# Patient Record
Sex: FEMALE | Race: WHITE | NOT HISPANIC OR LATINO | Employment: FULL TIME | URBAN - METROPOLITAN AREA
[De-identification: names, ages, dates, MRNs, and addresses within clinical notes are randomized per-mention and may not be internally consistent; named-entity substitution may affect disease eponyms.]

---

## 2022-03-10 ENCOUNTER — MEDICAL CORRESPONDENCE (OUTPATIENT)
Dept: HEALTH INFORMATION MANAGEMENT | Facility: CLINIC | Age: 47
End: 2022-03-10

## 2022-03-11 ENCOUNTER — REFERRAL (OUTPATIENT)
Dept: TRANSPLANT | Facility: CLINIC | Age: 47
End: 2022-03-11

## 2022-03-11 DIAGNOSIS — K70.30 ALCOHOLIC CIRRHOSIS (H): Primary | ICD-10-CM

## 2022-03-11 DIAGNOSIS — K70.31 ALCOHOLIC CIRRHOSIS OF LIVER WITH ASCITES (H): ICD-10-CM

## 2022-03-11 NOTE — LETTER
April 7, 2022    Priyanka RODRIGUEZ Dejesus  [address] [facility] [address]    Dear Ms. Dejesus,   The purpose of this letter is to let you know that on  [facility] liver transplant team will be closing your referral at this time. Based on the results from [facility] and the selection criteria used by our program , the decision was made to close your referral at this time. This is because of your sober date of one month ago, your current health status, and lack of insurance at this time.   Important things you should know:    If you would like to discuss the decision, or if your medical status changes you may schedule a return visits with your doctor by calling [phone] and asking to speak to your transplant coordinator.    We recommend that you continue to follow up with your primary care doctor and Florinda Ley NP in order to manage your health concerns.    Like we spoke about, please call me when you have proper insurance lined up and I will set you up with a virtual consult with one of our hepatologist.   Enclosed is a letter from [facility] which describes the services offered to patients by [facility] and the Organ Procurement and Transplantation Network.  Thank you for allowing us to participate in your care.  We wish you well.  Sincerely,    Gal Cisneros Jr., BSN, RN  Liver Transplant Coordinator  [phone]    Enclosures: OS Letter  cc: Care Team            The [facility]ment [facility]plantation Network  Toll-free patient services line:     Your resource for organ transplant information    If you have a question regarding your own medical care, you always should call your transplant hospital first. However, for general organ transplant-related information, you can call the Organ Procurement and Transplantation Network (OPTN) toll-free patient services line at [phone]. Anyone, including potential transplant candidates, candidates, recipients, family members, friends, living donors, and donor  family members, can call this number to:          Talk about organ donation, living donation, the transplant process, the donation process, and transplant policies.    Get a free patient information kit with helpful booklets, waiting list and transplant information, and a list of all transplant hospitals.    Ask questions about the OPTN website (https://optn.transplant.hrsa.gov/), the United Network for Organ Sharing s (UNOS) website (https://unos.org/), or the UNOS website for living donors and transplant recipients. (https://www.transplantliving.org/).    Learn how the OPTN can help you.    Talk about any concerns that you may have with a transplant hospital.    The [facility] s transplant system, the OPTN, is managed under federal contract by the United Network for Organ Sharing (UNOS), which is a non-profit charitable organization. The OPTN helps create and define organ sharing policies that make the best use of donated organs. This process continuously evaluating new advances and discoveries so policies can be adapted to best serve patients waiting for transplants. To do so, the OPTN works closely with transplant professionals, transplant patients, transplant candidates, donor families, living donors, and the public. All transplant programs and organ procurement organizations throughout the country are OPTN members and are obligated to follow the policies the OPTN creates for allocating organs.    The OPTN also is responsible for:      Providing educational material for patients, the public, and professionals.    Raising awareness of the need for donated organs and tissue.    Coordinating organ procurement, matching, and placement.    Collecting information about every organ transplant and donation that occurs in the [place].    Remember, you should contact your transplant hospital directly if you have questions or concerns about your own medical care including medical records, work-up progress, and test  results.    We are not your transplant hospital, and our staff will not be able to answer questions about your case, so please keep your transplant hospital s phone number handy.    However, while you research your transplant needs and learn as much as you can about transplantation and donation, we welcome your call to our toll-free patient services line at [phone].          Updated 4/1/2019

## 2022-03-11 NOTE — LETTER
March 18, 2022        Priyanka Dejesus  [address]  [facility], [place] [address]      Dear Healther,       You have recently expressed interest in our Solid Organ Transplant Program and have requested to begin the evaluation process.  We have made several attempts to get in touch with you but have been unable to reach you.    If you are still interested and/or have any questions, please contact us at  [phone] or [phone] and ask to speak with the .      Monday - Friday, between the hours of 8:00am and 5:00pm central time.    We look forward to hearing from you.      Regards,     Solid Organ Transplant Intake Team  [address]  [address], [facility] [address]  [place] [address]

## 2022-03-22 VITALS — BODY MASS INDEX: 29.99 KG/M2 | HEIGHT: 65 IN | WEIGHT: 180 LBS

## 2022-03-22 NOTE — TELEPHONE ENCOUNTER
Intake note not completed due to patient not having insurance.   Patient gave verbal consent to obtain records outside of [facility]/[place].     Sent message to [facility] and P[facility].     JACKLYN Robertson, LPN       Solid Organ Transplant

## 2022-04-06 PROBLEM — K70.31 ALCOHOLIC CIRRHOSIS OF LIVER WITH ASCITES (H): Status: ACTIVE | Noted: 2022-04-06

## 2022-04-06 NOTE — TELEPHONE ENCOUNTER
Called and LVM for patient.    Appears she discharged from [facility] 4/4/22.    Will try again later in the week.

## 2022-04-07 NOTE — TELEPHONE ENCOUNTER
Patient discharged and starting to feel better after being back home for a couple days.    Still has not figured out insurance yet.     Referral from Florinda Ley NP at [facility].     MELD 19     with no kids  Works as     First found out about liver disease early March (3/8/22 had first meeting with Florinda Ley NP at [facility]).    Last alcohol use was 3/3/22  Reports stopped right away and no reported issues  Reports 2-3 vodka drinks a day x approx 15-20 yrs    Patient had set up consult with therapist, but     Mobility is biggest issue    Head: no  Heart: no  Lungs: no  Kidneys: no  Cancer: no    She had a colonoscopy in 2015 with removal of a couple colon polyps.   She was told that she was to have a followup colonoscopy in 5 years    Plan: patient is going to work on getting insurance and has follow up appt with Nathaniel Ley NP at [facility].  She is going to call me once she has insurance to set up virtual consult with hepatologist from here.  Stressed importance of following up with Florinda and getting her insurance issues straightened out.    Will close referral at this time given insurance, one month sober, and MELD of 19 with hope of some recovery given age and sober date.